# Patient Record
Sex: FEMALE | Race: WHITE | NOT HISPANIC OR LATINO | URBAN - METROPOLITAN AREA
[De-identification: names, ages, dates, MRNs, and addresses within clinical notes are randomized per-mention and may not be internally consistent; named-entity substitution may affect disease eponyms.]

---

## 2018-05-09 ENCOUNTER — EMERGENCY (EMERGENCY)
Facility: HOSPITAL | Age: 73
LOS: 1 days | Discharge: HOME | End: 2018-05-09
Attending: EMERGENCY MEDICINE | Admitting: EMERGENCY MEDICINE
Payer: MEDICARE

## 2018-05-09 VITALS
TEMPERATURE: 96 F | OXYGEN SATURATION: 100 % | HEART RATE: 84 BPM | RESPIRATION RATE: 18 BRPM | DIASTOLIC BLOOD PRESSURE: 68 MMHG | SYSTOLIC BLOOD PRESSURE: 102 MMHG

## 2018-05-09 VITALS
DIASTOLIC BLOOD PRESSURE: 61 MMHG | OXYGEN SATURATION: 99 % | RESPIRATION RATE: 16 BRPM | SYSTOLIC BLOOD PRESSURE: 106 MMHG | HEART RATE: 106 BPM | TEMPERATURE: 96 F

## 2018-05-09 DIAGNOSIS — G93.41 METABOLIC ENCEPHALOPATHY: ICD-10-CM

## 2018-05-09 DIAGNOSIS — R41.82 ALTERED MENTAL STATUS, UNSPECIFIED: ICD-10-CM

## 2018-05-09 DIAGNOSIS — K83.1 OBSTRUCTION OF BILE DUCT: ICD-10-CM

## 2018-05-09 DIAGNOSIS — E16.2 HYPOGLYCEMIA, UNSPECIFIED: ICD-10-CM

## 2018-05-09 DIAGNOSIS — Z96.659 PRESENCE OF UNSPECIFIED ARTIFICIAL KNEE JOINT: Chronic | ICD-10-CM

## 2018-05-09 LAB
ALBUMIN SERPL ELPH-MCNC: 1.6 G/DL — LOW (ref 3.4–5)
ALBUMIN SERPL ELPH-MCNC: 1.7 G/DL — LOW (ref 3.4–5)
ALP SERPL-CCNC: 467 U/L — HIGH (ref 40–120)
ALP SERPL-CCNC: 644 U/L — HIGH (ref 40–120)
ALT FLD-CCNC: 129 U/L — HIGH (ref 12–42)
ALT FLD-CCNC: 87 U/L — HIGH (ref 12–42)
AMMONIA BLD-MCNC: 35 UMOL/L — HIGH (ref 11–32)
ANION GAP SERPL CALC-SCNC: 17 MMOL/L — HIGH (ref 9–16)
ANION GAP SERPL CALC-SCNC: 18 MMOL/L — HIGH (ref 9–16)
APPEARANCE UR: CLEAR — SIGNIFICANT CHANGE UP
APTT BLD: 60.2 SEC — HIGH (ref 27.5–36.5)
AST SERPL-CCNC: 222 U/L — HIGH (ref 15–37)
AST SERPL-CCNC: 296 U/L — HIGH (ref 15–37)
BASOPHILS NFR BLD AUTO: 0.2 % — SIGNIFICANT CHANGE UP (ref 0–2)
BILIRUB DIRECT SERPL-MCNC: 12.6 MG/DL — HIGH
BILIRUB SERPL-MCNC: 16.1 MG/DL — HIGH (ref 0.2–1.2)
BILIRUB SERPL-MCNC: 20.1 MG/DL — HIGH (ref 0.2–1.2)
BILIRUB UR-MCNC: (no result)
BUN SERPL-MCNC: 79 MG/DL — HIGH (ref 7–23)
BUN SERPL-MCNC: 83 MG/DL — HIGH (ref 7–23)
CALCIUM SERPL-MCNC: 7.3 MG/DL — LOW (ref 8.5–10.5)
CALCIUM SERPL-MCNC: 8.5 MG/DL — SIGNIFICANT CHANGE UP (ref 8.5–10.5)
CHLORIDE SERPL-SCNC: 96 MMOL/L — SIGNIFICANT CHANGE UP (ref 96–108)
CHLORIDE SERPL-SCNC: 98 MMOL/L — SIGNIFICANT CHANGE UP (ref 96–108)
CK SERPL-CCNC: 119 U/L — SIGNIFICANT CHANGE UP (ref 26–192)
CO2 SERPL-SCNC: 14 MMOL/L — LOW (ref 22–31)
CO2 SERPL-SCNC: 15 MMOL/L — LOW (ref 22–31)
COLOR SPEC: YELLOW — SIGNIFICANT CHANGE UP
CREAT SERPL-MCNC: 1.7 MG/DL — HIGH (ref 0.5–1.3)
CREAT SERPL-MCNC: 1.73 MG/DL — HIGH (ref 0.5–1.3)
DIFF PNL FLD: (no result)
EOSINOPHIL NFR BLD AUTO: 11.6 % — HIGH (ref 0–6)
GLUCOSE SERPL-MCNC: 355 MG/DL — HIGH (ref 70–99)
GLUCOSE SERPL-MCNC: 78 MG/DL — SIGNIFICANT CHANGE UP (ref 70–99)
GLUCOSE UR QL: 500
HCT VFR BLD CALC: 28.6 % — LOW (ref 34.5–45)
HCT VFR BLD CALC: 38.9 % — SIGNIFICANT CHANGE UP (ref 34.5–45)
HGB BLD-MCNC: 10.1 G/DL — LOW (ref 11.5–15.5)
HGB BLD-MCNC: 13.3 G/DL — SIGNIFICANT CHANGE UP (ref 11.5–15.5)
IMM GRANULOCYTES NFR BLD AUTO: 1.8 % — HIGH (ref 0–1.5)
INR BLD: 2.59 — HIGH (ref 0.88–1.16)
KETONES UR-MCNC: NEGATIVE — SIGNIFICANT CHANGE UP
LACTATE SERPL-SCNC: 6.4 MMOL/L — CRITICAL HIGH (ref 0.4–2)
LACTATE SERPL-SCNC: 7.3 MMOL/L — CRITICAL HIGH (ref 0.4–2)
LEUKOCYTE ESTERASE UR-ACNC: NEGATIVE — SIGNIFICANT CHANGE UP
LYMPHOCYTES # BLD AUTO: 4.6 % — LOW (ref 13–44)
MAGNESIUM SERPL-MCNC: 2.6 MG/DL — SIGNIFICANT CHANGE UP (ref 1.6–2.6)
MCHC RBC-ENTMCNC: 30 PG — SIGNIFICANT CHANGE UP (ref 27–34)
MCHC RBC-ENTMCNC: 30.4 PG — SIGNIFICANT CHANGE UP (ref 27–34)
MCHC RBC-ENTMCNC: 34.2 G/DL — SIGNIFICANT CHANGE UP (ref 32–36)
MCHC RBC-ENTMCNC: 35.3 G/DL — SIGNIFICANT CHANGE UP (ref 32–36)
MCV RBC AUTO: 86.1 FL — SIGNIFICANT CHANGE UP (ref 80–100)
MCV RBC AUTO: 87.6 FL — SIGNIFICANT CHANGE UP (ref 80–100)
MONOCYTES NFR BLD AUTO: 8.3 % — SIGNIFICANT CHANGE UP (ref 2–14)
NEUTROPHILS NFR BLD AUTO: 73.5 % — SIGNIFICANT CHANGE UP (ref 43–77)
NITRITE UR-MCNC: NEGATIVE — SIGNIFICANT CHANGE UP
PCO2 BLDV: 35 MMHG — LOW (ref 41–51)
PH BLDV: 7.35 — SIGNIFICANT CHANGE UP (ref 7.32–7.43)
PH UR: SIGNIFICANT CHANGE UP (ref 5–8)
PLATELET # BLD AUTO: 68 K/UL — LOW (ref 150–400)
PLATELET # BLD AUTO: 71 K/UL — LOW (ref 150–400)
PO2 BLDV: 30 MMHG — LOW (ref 35–40)
POTASSIUM SERPL-MCNC: 4.6 MMOL/L — SIGNIFICANT CHANGE UP (ref 3.5–5.3)
POTASSIUM SERPL-MCNC: 5.4 MMOL/L — HIGH (ref 3.5–5.3)
POTASSIUM SERPL-SCNC: 4.6 MMOL/L — SIGNIFICANT CHANGE UP (ref 3.5–5.3)
POTASSIUM SERPL-SCNC: 5.4 MMOL/L — HIGH (ref 3.5–5.3)
PROT SERPL-MCNC: 4.6 G/DL — LOW (ref 6.4–8.2)
PROT SERPL-MCNC: 6 G/DL — LOW (ref 6.4–8.2)
PROT UR-MCNC: (no result) MG/DL
PROTHROM AB SERPL-ACNC: 29.1 SEC — HIGH (ref 9.8–12.7)
RBC # BLD: 3.32 M/UL — LOW (ref 3.8–5.2)
RBC # BLD: 4.44 M/UL — SIGNIFICANT CHANGE UP (ref 3.8–5.2)
RBC # FLD: 23.6 % — HIGH (ref 10.3–16.9)
RBC # FLD: 24.8 % — HIGH (ref 10.3–16.9)
SAO2 % BLDV: 49 % — SIGNIFICANT CHANGE UP
SODIUM SERPL-SCNC: 129 MMOL/L — LOW (ref 132–145)
SODIUM SERPL-SCNC: 129 MMOL/L — LOW (ref 132–145)
SP GR SPEC: 1.01 — SIGNIFICANT CHANGE UP (ref 1–1.03)
TROPONIN I SERPL-MCNC: 0.02 NG/ML — SIGNIFICANT CHANGE UP (ref 0.02–0.06)
UROBILINOGEN FLD QL: 1 E.U./DL — SIGNIFICANT CHANGE UP
WBC # BLD: 26.8 K/UL — HIGH (ref 3.8–10.5)
WBC # BLD: 38.9 K/UL — HIGH (ref 3.8–10.5)
WBC # FLD AUTO: 26.8 K/UL — HIGH (ref 3.8–10.5)
WBC # FLD AUTO: 38.9 K/UL — HIGH (ref 3.8–10.5)

## 2018-05-09 PROCEDURE — 74177 CT ABD & PELVIS W/CONTRAST: CPT | Mod: 26

## 2018-05-09 PROCEDURE — 99291 CRITICAL CARE FIRST HOUR: CPT | Mod: 25

## 2018-05-09 PROCEDURE — 72125 CT NECK SPINE W/O DYE: CPT | Mod: 26

## 2018-05-09 PROCEDURE — 99292 CRITICAL CARE ADDL 30 MIN: CPT | Mod: 25

## 2018-05-09 PROCEDURE — 36556 INSERT NON-TUNNEL CV CATH: CPT

## 2018-05-09 PROCEDURE — 70486 CT MAXILLOFACIAL W/O DYE: CPT | Mod: 26

## 2018-05-09 PROCEDURE — 93010 ELECTROCARDIOGRAM REPORT: CPT | Mod: 76,59

## 2018-05-09 PROCEDURE — 71260 CT THORAX DX C+: CPT | Mod: 26

## 2018-05-09 PROCEDURE — 70450 CT HEAD/BRAIN W/O DYE: CPT | Mod: 26

## 2018-05-09 RX ORDER — DEXTROSE 50 % IN WATER 50 %
50 SYRINGE (ML) INTRAVENOUS ONCE
Qty: 0 | Refills: 0 | Status: COMPLETED | OUTPATIENT
Start: 2018-05-09 | End: 2018-05-09

## 2018-05-09 RX ORDER — HEPARIN SODIUM 5000 [USP'U]/ML
INJECTION INTRAVENOUS; SUBCUTANEOUS
Qty: 25000 | Refills: 0 | Status: DISCONTINUED | OUTPATIENT
Start: 2018-05-09 | End: 2018-05-09

## 2018-05-09 RX ORDER — NALOXONE HYDROCHLORIDE 4 MG/.1ML
2 SPRAY NASAL ONCE
Qty: 0 | Refills: 0 | Status: COMPLETED | OUTPATIENT
Start: 2018-05-09 | End: 2018-05-09

## 2018-05-09 RX ORDER — OXYCODONE AND ACETAMINOPHEN 5; 325 MG/1; MG/1
1 TABLET ORAL ONCE
Qty: 0 | Refills: 0 | Status: DISCONTINUED | OUTPATIENT
Start: 2018-05-09 | End: 2018-05-09

## 2018-05-09 RX ORDER — FENTANYL CITRATE 50 UG/ML
25 INJECTION INTRAVENOUS ONCE
Qty: 0 | Refills: 0 | Status: COMPLETED | OUTPATIENT
Start: 2018-05-09 | End: 2018-05-09

## 2018-05-09 RX ORDER — FENTANYL CITRATE 50 UG/ML
25 INJECTION INTRAVENOUS ONCE
Qty: 0 | Refills: 0 | Status: DISCONTINUED | OUTPATIENT
Start: 2018-05-09 | End: 2018-05-09

## 2018-05-09 RX ORDER — ONDANSETRON 8 MG/1
8 TABLET, FILM COATED ORAL ONCE
Qty: 0 | Refills: 0 | Status: COMPLETED | OUTPATIENT
Start: 2018-05-09 | End: 2018-05-09

## 2018-05-09 RX ORDER — HEPARIN SODIUM 5000 [USP'U]/ML
6000 INJECTION INTRAVENOUS; SUBCUTANEOUS ONCE
Qty: 0 | Refills: 0 | Status: DISCONTINUED | OUTPATIENT
Start: 2018-05-09 | End: 2018-05-09

## 2018-05-09 RX ORDER — SODIUM CHLORIDE 9 MG/ML
1000 INJECTION, SOLUTION INTRAVENOUS
Qty: 0 | Refills: 0 | Status: DISCONTINUED | OUTPATIENT
Start: 2018-05-09 | End: 2018-05-13

## 2018-05-09 RX ORDER — VANCOMYCIN HCL 1 G
1000 VIAL (EA) INTRAVENOUS ONCE
Qty: 0 | Refills: 0 | Status: COMPLETED | OUTPATIENT
Start: 2018-05-09 | End: 2018-05-09

## 2018-05-09 RX ORDER — ALBUMIN HUMAN 25 %
50 VIAL (ML) INTRAVENOUS ONCE
Qty: 0 | Refills: 0 | Status: COMPLETED | OUTPATIENT
Start: 2018-05-09 | End: 2018-05-09

## 2018-05-09 RX ORDER — PIPERACILLIN AND TAZOBACTAM 4; .5 G/20ML; G/20ML
3.38 INJECTION, POWDER, LYOPHILIZED, FOR SOLUTION INTRAVENOUS ONCE
Qty: 0 | Refills: 0 | Status: COMPLETED | OUTPATIENT
Start: 2018-05-09 | End: 2018-05-09

## 2018-05-09 RX ORDER — SODIUM CHLORIDE 9 MG/ML
1000 INJECTION INTRAMUSCULAR; INTRAVENOUS; SUBCUTANEOUS ONCE
Qty: 0 | Refills: 0 | Status: COMPLETED | OUTPATIENT
Start: 2018-05-09 | End: 2018-05-09

## 2018-05-09 RX ORDER — HEPARIN SODIUM 5000 [USP'U]/ML
700 INJECTION INTRAVENOUS; SUBCUTANEOUS
Qty: 25000 | Refills: 0 | Status: DISCONTINUED | OUTPATIENT
Start: 2018-05-09 | End: 2018-05-13

## 2018-05-09 RX ORDER — SODIUM CHLORIDE 9 MG/ML
500 INJECTION INTRAMUSCULAR; INTRAVENOUS; SUBCUTANEOUS ONCE
Qty: 0 | Refills: 0 | Status: COMPLETED | OUTPATIENT
Start: 2018-05-09 | End: 2018-05-09

## 2018-05-09 RX ADMIN — Medication 50 MILLILITER(S): at 09:55

## 2018-05-09 RX ADMIN — Medication 250 MILLIGRAM(S): at 11:26

## 2018-05-09 RX ADMIN — Medication 50 MILLILITER(S): at 11:02

## 2018-05-09 RX ADMIN — FENTANYL CITRATE 25 MICROGRAM(S): 50 INJECTION INTRAVENOUS at 12:30

## 2018-05-09 RX ADMIN — Medication 50 MILLILITER(S): at 11:41

## 2018-05-09 RX ADMIN — HEPARIN SODIUM 700 UNIT(S)/HR: 5000 INJECTION INTRAVENOUS; SUBCUTANEOUS at 12:30

## 2018-05-09 RX ADMIN — SODIUM CHLORIDE 1000 MILLILITER(S): 9 INJECTION INTRAMUSCULAR; INTRAVENOUS; SUBCUTANEOUS at 12:29

## 2018-05-09 RX ADMIN — ONDANSETRON 8 MILLIGRAM(S): 8 TABLET, FILM COATED ORAL at 12:30

## 2018-05-09 RX ADMIN — NALOXONE HYDROCHLORIDE 0.4 MILLIGRAM(S): 4 SPRAY NASAL at 11:37

## 2018-05-09 RX ADMIN — OXYCODONE AND ACETAMINOPHEN 1 TABLET(S): 5; 325 TABLET ORAL at 14:56

## 2018-05-09 RX ADMIN — SODIUM CHLORIDE 100 MILLILITER(S): 9 INJECTION, SOLUTION INTRAVENOUS at 11:27

## 2018-05-09 RX ADMIN — FENTANYL CITRATE 25 MICROGRAM(S): 50 INJECTION INTRAVENOUS at 13:17

## 2018-05-09 RX ADMIN — SODIUM CHLORIDE 2000 MILLILITER(S): 9 INJECTION INTRAMUSCULAR; INTRAVENOUS; SUBCUTANEOUS at 11:27

## 2018-05-09 RX ADMIN — PIPERACILLIN AND TAZOBACTAM 200 GRAM(S): 4; .5 INJECTION, POWDER, LYOPHILIZED, FOR SOLUTION INTRAVENOUS at 10:50

## 2018-05-09 NOTE — ED PROVIDER NOTE - PROGRESS NOTE DETAILS
FS 40 on arrival, difficult stick on exam due to anasarca and severe dehydration, s/p L EJ placement with 2 amps of D5 given, will recheck FS and contact PMD/family for collaterals FS remains critically low, will give another 2 amps of D5 and start on D5NS gtt pt's family and PMD - Dr. Hua Quezada [phone] contacted and notified, reports pt remains full code and was at her [facility] 2d ago, leaning towards hospice but to be remained full code at this point.  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 Pt's cousin - Alan, made aware of pt's condition as well, agrees with current management and keep pt full code until inpt discussion with palliative team, unable to make decision on goals of care over the phone, will come in to see pt later this afternoon although labs with lactic uptrending (likely due to ischemic demand from complete thrombosis of portal vein and mesenteric vein), unable to aggressively fluid resuscitate due to pt's end stage liver dysfunction and anasarca with low reserve and low threshold for intubation. However, pt's cousin, at bedside - Alan (next of kin), who reports pt was leaning towards palliative / comfort care, but unable to decide on DNR/DNI status without family discussion, desires to hold back on more labs and invasive procedures such as central line, etc, if possible until transfer to  for definitive tx and discussion with inpatient team, will keep pt comfortable with pain control and supportive care FS remains critically low, will give another 2 amps of D5 and start on D5NS gtt.  Pt will extremely difficult peripheral access. Will attempt for another peripheral IV for medication administration and CT w IV contrast pt's family and PMD - Dr. Hua Quezada [phone] contacted and notified, reports pt remains full code and was at her [facility] 2d ago, leaning towards hospice but to be remained full code at this point.  Given recently prescribed narcotic for pain control, will give narcan and assess for response to pt's altered mental status, likely metabolic encephalopathy in addition to polypharmacy/narcotic induced pt has received a bed assignment at  MICU, PMD Dr. Quezada updated and made aware of acceptance and transfer destination.  Pt's cousin - Alan, at bedside, will accompany patient to  and further discuss goals of care of primary inpatient care team FS remains critically low, likely 2/2 insulin resistance with abnormal glucose metabolism due to underlying pancreatic CA and decreased po intake for the past 2d, will give another 2 amps of D5 and start on D5NS gtt/maintenance fluid.  Pt with extremely difficult peripheral access. Will attempt for another peripheral IV for medication administration and CT w IV contrast

## 2018-05-09 NOTE — ED PROCEDURE NOTE - PROCEDURE ADDITIONAL DETAILS
R femoral arterial stick was performed for repeat labs with 18 gauge sterile needle and 10cc of blood was removed under aseptic techniques for sampling. No catheter inserted.  Local pressure held for 5 mins and no active bleeding noted
given difficult peripheral access and multiple failed attempts by RNs, another 20 gauge peripheral IV inserted in the R AC region for volume resuscitation and medication infusion

## 2018-05-09 NOTE — ED PROVIDER NOTE - CARE PLAN
Principal Discharge DX:	Obstructive jaundice due to malignant neoplasm  Secondary Diagnosis:	Metabolic encephalopathy  Secondary Diagnosis:	Hypoglycemia

## 2018-05-09 NOTE — ED ADULT TRIAGE NOTE - CHIEF COMPLAINT QUOTE
Patient found on floor of her apartment by her neighbor, patient+ fall with abrasion to forehead on blood thinners. During triage pt mumbling, jaundice and mouth dry, states she has not eaten in 1 week. H/o cirrhosis

## 2018-05-09 NOTE — ED PROVIDER NOTE - CRITICAL CARE PROVIDED
direct patient care (not related to procedure)/additional history taking/consult w/ pt's family directly relating to pts condition/documentation/interpretation of diagnostic studies/consultation with other physicians/conducted a detailed discussion of DNR status/telephone consultation with the patient's family

## 2018-05-09 NOTE — ED PROVIDER NOTE - ATTENDING CONTRIBUTION TO CARE
AMS found on floor h/o metastatic pancreatic CA, lethargic, decreased mental status, maintaining airway , tachycardic decrease bs at bases, +ascites, +edema, Ct with metastatic disease and portal vein thrombosis, covered with antibiotics, dextrose for hyoglycemia, heparin gtt, admitted to [facility]U for further work up and treatment as indicated

## 2018-05-09 NOTE — ED PROCEDURE NOTE - CPROC ED INFUS LINE DETAIL1
The location was identified, and the area was draped and prepped./The catheter was placed using sterile technique./All lumen(s) aspirated and flushed without difficulty.

## 2018-05-09 NOTE — ED PROVIDER NOTE - OBJECTIVE STATEMENT
73 yo F with PMHx of 73 yo F with PMHx of recently metastatic pancreatitic CA, 73 yo F with PMHx of recently metastatic pancreatitic CA few wks ago, s/p fine needle bx 2d ago at outpt [facility] office with PMD Dr. Hua Quezada ([phone]), osteoporosis, otherwise independent and healthy F prior to recent dx, AxOx3 and ADL intact prior to current visit and last seen at her baseline 2d ago by PMD, BIBA for AMS and found on the floor.  Per EMS, pt's neighbor heard a loud thump this morning and asked the super to open the door and call EMS for her.  Pt was found on the ground with AMS and lethargic with bruises to her forehead and knees.  Pt is clinically altered and unable to provide any further information. No apparent laceration, bleeding, respiratory distress, urinary/bowel incontinence, or fever/chills noted.  Not sure if pt is on any AC/NSAIDs.  Was told recently prescribed tramadol and oxycodone 73 yo F with PMHx of recently metastatic pancreatitic CA few wks ago, s/p fine needle bx 2d ago at outpt [facility] office with PMD Dr. Hua Quezada ([phone]), osteoporosis, otherwise independent and healthy F prior to recent dx, AxOx3 and ADL intact prior to current visit and last seen at her baseline 2d ago by PMD, BIBA for AMS and found on the floor.  Per EMS, pt's neighbor heard a loud thump this morning and asked the super to open the door and call EMS for her.  Pt was found on the ground with AMS and lethargic with bruises to her forehead and knees.  Pt is clinically altered and unable to provide any further information. No apparent laceration, bleeding, respiratory distress, urinary/bowel incontinence, or fever/chills noted.  Not sure if pt is on any AC/NSAIDs.  Was told recently prescribed tramadol and oxycodone by GI for pain control. 71 yo F with PMHx of recently metastatic pancreatic CA few wks ago, s/p fine needle bx 2d ago at outpt [facility] office with PMD Dr. Hua Quezada ([phone]), osteoporosis, otherwise independent and healthy F prior to recent dx, AxOx3 and ADL intact prior to current visit and last seen at her baseline 2d ago by PMD, BIBA for AMS and found on the floor.  Per EMS, pt's neighbor heard a loud thump this morning and asked the super to open the door and call EMS for her.  Pt was found on the ground with AMS and lethargic with bruises to her forehead and knees.  Pt is clinically altered and unable to provide any further information. No apparent laceration, bleeding, respiratory distress, urinary/bowel incontinence, or fever/chills noted.  Not sure if pt is on any AC/NSAIDs.  Was told recently prescribed tramadol and oxycodone by GI for pain control. 73 yo F with PMHx of recently diagnosed metastatic pancreatic CA few wks ago, s/p fine needle bx 2d ago at outpt [facility] office with PMD Dr. Hua Quezada ([phone]), osteoporosis, otherwise independent and healthy F prior to recent dx, AxOx3 and ADL intact prior to current visit and last seen at her baseline 2d ago by PMD, BIBA for AMS and found on the floor.  Per EMS, pt's neighbor heard a loud thump this morning and asked the super to open the door and call EMS for her.  Pt was found on the ground with AMS and lethargic with bruises to her forehead and knees.  Pt is clinically altered and unable to provide any further information. No apparent laceration, bleeding, respiratory distress, urinary/bowel incontinence, or fever/chills noted.  Not sure if pt is on any AC/NSAIDs.  Was told recently prescribed tramadol and oxycodone by GI for pain control.

## 2018-05-09 NOTE — ED ADULT NURSE NOTE - OBJECTIVE STATEMENT
roxannea, called by neighbor after she heard the patient fall, scattered ecchymotic areas to face and body, disoriented to time only, ble edema noted, generailized weakness noted

## 2018-05-09 NOTE — ED PROVIDER NOTE - PHYSICAL EXAMINATION
Vital Signs - nursing notes reviewed and confirmed  Gen - Ill appearing elderly F, lethargic and minimally arousable to verbal stimuli   Skin - warm, dry, ecchymosis to the R frontal scalp region, b/l knee, no active bleeding or d/c   HEENT - AT/NC, PERRL, EOMI, +icteric sclera b/l, dry oral mucosa, o/p clear with no erythema, edema, or exudate, uvula midline, airway patent, neck supple and NT, FROM, no JVD or carotid bruits b/l, no palpable nodes  CV - S1S2, rapid rate, regular rhythm, no m/r/g  Resp - respiration non-labored, CTAB, diminished bs b/l with no focal r/r/w   GI - NABS, soft, +protuberant, +fluid wave, no rebound or guarding, no CVAT b/l   MS - w/w/p, 2+BLE pitting edema, calves supple and NT, distal pulses symmetric b/l   Neuro - lethargic, minimally arousable to verbal stimuli Vital Signs - nursing notes reviewed and confirmed  Gen - Ill appearing elderly F, lethargic and minimally arousable to verbal stimuli   Skin - warm, dry, +jaundice, ecchymosis to the R frontal scalp region, b/l knee, no active bleeding or d/c   HEENT - AT/NC, PERRL, EOMI, +icteric sclera b/l, dry oral mucosa, o/p clear with no erythema, edema, or exudate, uvula midline, airway patent, neck supple and NT, FROM, no JVD or carotid bruits b/l, no palpable nodes  CV - S1S2, rapid rate, regular rhythm, no m/r/g  Resp - respiration non-labored, CTAB, diminished bs b/l with no focal r/r/w   GI - NABS, soft, +protuberant, +fluid wave, no rebound or guarding, no CVAT b/l   MS - w/w/p, 2+BLE pitting edema, calves supple and NT, distal pulses symmetric b/l   Neuro - lethargic, minimally arousable to verbal stimuli Vital Signs - nursing notes reviewed and confirmed  Gen - Ill appearing elderly F, lethargic and minimally arousable to verbal stimuli   Skin - warm, dry, +jaundice, ecchymosis to the R frontal scalp region, b/l knee, no active bleeding or d/c, bx site to the RUQ with dressing C/D/I, no active bleeding or dc   HEENT - AT/NC, PERRL, EOMI, +icteric sclera b/l, dry oral mucosa, o/p clear with no erythema, edema, or exudate, uvula midline, airway patent, neck supple and NT, FROM, no JVD b/l   CV - S1S2, rapid rate, regular rhythm, no m/r/g  Resp - respiration non-labored, CTAB, diminished bs b/l with no focal r/r/w, R>L   GI - NABS, soft, +protuberant, +fluid wave, no rebound or guarding, no CVAT b/l   MS - w/w/p, 2+anasarca/b/l pitting edema, calves supple and NT, +palpable distal pulses b/l    Neuro - lethargic, minimally arousable to verbal stimuli Vital Signs - nursing notes reviewed and confirmed  Gen - Ill appearing elderly F, lethargic and minimally arousable to verbal stimuli   Skin - warm, dry, +jaundice, ecchymosis to the R frontal scalp region, b/l knee, no active bleeding or d/c, bx site to the RUQ with dressing C/D/I, no active bleeding or dc   HEENT - NC, PERRL, +ecchymoses to R frontal scalp region, +icteric sclera b/l, dry oral mucosa, no loose teeth, o/p clear with no erythema, edema, or exudate, uvula midline, airway patent, neck supple and NT, FROM, no JVD b/l   CV - S1S2, rapid rate, regular rhythm, no m/r/g  Resp - respiration non-labored, CTAB, diminished bs b/l with no focal r/r/w, R>L   GI - NABS, soft, +protuberant, +fluid wave, no rebound or guarding, no CVAT b/l   MS - w/w/p, 2+anasarca/b/l pitting edema, calves supple and NT, +palpable distal pulses b/l    Neuro - lethargic, minimally arousable to verbal stimuli

## 2018-05-09 NOTE — ED PROVIDER NOTE - MEDICAL DECISION MAKING DETAILS
pt with recently dx metastatic pancreatitic CA few wks ago, s/p needle bx 2d ago, found to be at her [facility] 2d ago, now with AMS and found on the floor, +jaundice on exam with ecchymoses and anasarca, suspicious for advance malignancy w liver dz and obstructive jaundice, rectal temp 95.5, pt altered and minimally responsive to verbal stimuli, s/p pan culture and pan scan, labs significant for marked leukocytosis of 38K, PLT 68K, electrolyte abnormalities, coagulopathy likely due to liver dz, T.bili 20 with direct bili of 12, LA 6.4, FS 40 on arrival, s/p 2 amps dextrose IVP with no improvement in FS, another 2 amps given with appropriate rise of glucose to 190, given +cirrhosis and liver dz with third spacing, will do slow fluid boluses to avoid fluid overload, started on D5/NS gtt, albumin for liver dz, s/p narcan with slight improvement in mental status given recent Rx of narcotic as well, CT remarkable for +necrotic pancreatic mass with mets to liver and lung, +ascites, +complete and partial occlusion of portal vein to iliac vein, started on low dose heparin gtt with low PTT goal due to increased risk of bleeding, case discussed with PMD Dr. Quezada, given recent dx and bx at , desires pt to be transferred back to , case discussed with transfer center, accepted to MICU under Dr. Lexi Ayon pt with recently dx metastatic pancreatic CA few wks ago, s/p needle bx 2d ago, found to be at her [facility] 2d ago, now with AMS and found on the floor, +jaundice on exam with ecchymoses and anasarca, suspicious for advance malignancy w liver dz and obstructive jaundice, rectal temp 95.5, pt altered and minimally responsive to verbal stimuli, s/p pan culture and pan scan, labs significant for marked leukocytosis of 38K, PLT 68K, electrolyte abnormalities, coagulopathy likely due to liver dz, T.bili 20 with direct bili of 12, LA 6.4, FS 40 on arrival, s/p 2 amps dextrose IVP with no improvement in FS, another 2 amps given with appropriate rise of glucose to 190, given +cirrhosis and liver dz with third spacing, will do slow fluid boluses to avoid fluid overload, started on D5/NS gtt, albumin for liver dz, s/p narcan with slight improvement in mental status given recent Rx of narcotic as well, CT remarkable for +necrotic pancreatic mass with mets to liver and lung, +ascites, +complete and partial occlusion of portal vein to iliac vein, started on low dose heparin gtt with low PTT goal due to increased risk of bleeding, case discussed with PMD Dr. Quezada, given recent dx and bx at , desires pt to be transferred back to , case discussed with transfer center, accepted to MICU under Dr. Lexi Ayon

## 2018-05-10 LAB
CULTURE RESULTS: SIGNIFICANT CHANGE UP
SPECIMEN SOURCE: SIGNIFICANT CHANGE UP

## 2018-05-14 LAB
CULTURE RESULTS: SIGNIFICANT CHANGE UP
CULTURE RESULTS: SIGNIFICANT CHANGE UP
SPECIMEN SOURCE: SIGNIFICANT CHANGE UP
SPECIMEN SOURCE: SIGNIFICANT CHANGE UP
